# Patient Record
Sex: FEMALE
[De-identification: names, ages, dates, MRNs, and addresses within clinical notes are randomized per-mention and may not be internally consistent; named-entity substitution may affect disease eponyms.]

---

## 2020-09-09 ENCOUNTER — HOSPITAL ENCOUNTER (OUTPATIENT)
Dept: HOSPITAL 88 - MRI | Age: 80
End: 2020-09-09
Attending: SPECIALIST
Payer: MEDICARE

## 2020-09-09 DIAGNOSIS — M75.101: Primary | ICD-10-CM

## 2020-09-10 NOTE — DIAGNOSTIC IMAGING REPORT
MRI of the right shoulder without contrast.



History:  Shoulder pain. Fall. Rotator cuff tear. Decreased range of motion.

Pain not responding to conservative management



Comparison:  None



Technique: Coronal PD FS, sagital PD FS, and axial PD and PD FS.



Findings:

Rotator cuff: Rotator cuff tendinosis with full-thickness tear involving the

supraspinatus and infraspinatus tendons at the humeral insertion site. There is

retraction of the torn fibers to the level of the glenoid and there is marked

supraspinatus and infraspinatus muscle atrophy. Additionally, there is

subscapularis tendinosis with bursal sided partial tearing. The teres minor

tendon is intact. There is a moderate amount of edema in the supraspinatus and

infraspinatus musculature extending more centrally which suggests a more acute

injury.



Osseous acromion complex: Type II acromion with mild lateral downsloping.

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 Moderate

subacromial/subdeltoid bursitis. The humeral head abuts the undersurface of the

acromion.



Glenohumeral joint: Degeneration and fraying of the labrum. The articular

cartilage surfaces are within. Moderate-sized glenohumeral joint effusion and

synovitis.



Biceps tendon: Intra-articular biceps tendinosis with fraying at the biceps

anchor.



Other findings: Negative for muscle denervation or osseous fracture.



Impression:

Rotator cuff tendinosis with full-thickness tear involving the supraspinatus

and infraspinatus tendons at the humeral insertion site. There is retraction of

the torn fibers to the level of the glenoid and there is marked supraspinatus

and infraspinatus muscle atrophy. 



Additionally, there is subscapularis tendinosis with bursal sided partial

tearing. 



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 Moderate

subacromial/subdeltoid bursitis. The humeral head abuts the undersurface of the

acromion.



Signed by: Dr. Eyad Wgigins M.D. on 9/10/2020 9:52 AM